# Patient Record
Sex: FEMALE | URBAN - METROPOLITAN AREA
[De-identification: names, ages, dates, MRNs, and addresses within clinical notes are randomized per-mention and may not be internally consistent; named-entity substitution may affect disease eponyms.]

---

## 2018-06-04 ENCOUNTER — MEDICAL CORRESPONDENCE (OUTPATIENT)
Dept: HEALTH INFORMATION MANAGEMENT | Facility: CLINIC | Age: 38
End: 2018-06-04

## 2018-06-05 ENCOUNTER — TELEPHONE (OUTPATIENT)
Dept: RHEUMATOLOGY | Facility: CLINIC | Age: 38
End: 2018-06-05

## 2018-06-05 NOTE — TELEPHONE ENCOUNTER
[facility] Call Center    Phone Message    May a detailed message be left on voicemail: yes    Reason for Call: Other: New Patient Process, DX: Muscle Spasms and Tension, referred by Pcp Dr. Shameka Harper, records and referral from [facility], will fax.     Action Taken: Message routed to:  [facility]

## 2018-06-18 NOTE — TELEPHONE ENCOUNTER
[facility]    Phone Message    May a detailed message be left on voicemail: no    Reason for Call: Other: Pt is returning Swati's call     Action Taken: Message routed to:  [facility] Rheum

## 2018-06-18 NOTE — TELEPHONE ENCOUNTER
Call was placed to patient regarding the referral we received for muscle spasms and tension from Shameka Harper, however, there was no answer. Message was left asking patient to call the clinic back to complete an intake form and discuss if there are outside records needed. Awaiting a call back from the patient.  Swati Huff CMA  6/18/2018 10:54 AM

## 2018-06-20 NOTE — TELEPHONE ENCOUNTER
Second and final attempt to contact patient regarding the referral we received. Message left asking patient to return my call to complete an intake form.  Swati Huff CMA  6/20/2018 3:12 PM

## 2018-06-20 NOTE — TELEPHONE ENCOUNTER
[facility]    Phone Message    May a detailed message be left on voicemail: yes    Reason for Call: Other: Pt is calling Swati back. She will try to catch Swati's call - she has meetings off and on.     Action Taken: Message routed to:  [facility]

## 2018-06-22 NOTE — TELEPHONE ENCOUNTER
General Rheumatology Intake Form      What is the reason for referral? Muscle spasms, muscle tension      What is the name of the provider that referred you to us? Shameka Harper at [facility]      Have you seen a Rheumatologist in the past?  no        Have you been diagnosed with Fibromyalgia? Not formally diagnosed, however the possibility has been suggested        Who manages your care for this issue now? Dr. Harper       What is your most urgent concern at this time? Patient stated that she feels like her muscles are overreacting causing her fatigue        Have you seen any specialist related to the reason you are coming here? no       Where are we expecting records (labs, imaging or pathology) from? Records are available via [facility] Everywhere for [facility]    Chart has been sent the Rheumatology Team to review to see if appropriate to schedule.       Swati Huff CMA  6/22/2018 10:33 AM

## 2018-06-26 NOTE — TELEPHONE ENCOUNTER
Per the Rheumatology Team, it is recommended that the patient see a community Rheumatologist. Spoke to patient and relayed this information to her. Patient verbalized understanding.  Swati Huff CMA  6/26/2018 2:31 PM

## 2019-04-19 ENCOUNTER — HEALTH MAINTENANCE LETTER (OUTPATIENT)
Age: 39
End: 2019-04-19

## 2019-05-20 ENCOUNTER — OFFICE VISIT (OUTPATIENT)
Dept: OPHTHALMOLOGY | Facility: CLINIC | Age: 39
End: 2019-05-20
Attending: OPHTHALMOLOGY
Payer: COMMERCIAL

## 2019-05-20 DIAGNOSIS — H50.10 EXOTROPIA OF BOTH EYES: ICD-10-CM

## 2019-05-20 DIAGNOSIS — H57.89 THYROID EYE DISEASE: Primary | ICD-10-CM

## 2019-05-20 DIAGNOSIS — E07.9 THYROID EYE DISEASE: Primary | ICD-10-CM

## 2019-05-20 PROBLEM — M79.10 MYALGIA: Status: ACTIVE | Noted: 2017-09-28

## 2019-05-20 PROBLEM — E05.90 HYPERTHYROIDISM: Status: ACTIVE | Noted: 2018-07-26

## 2019-05-20 PROBLEM — E55.9 VITAMIN D DEFICIENCY: Status: ACTIVE | Noted: 2017-12-06

## 2019-05-20 PROCEDURE — G0463 HOSPITAL OUTPT CLINIC VISIT: HCPCS | Mod: ZF

## 2019-05-20 PROCEDURE — 92060 SENSORIMOTOR EXAMINATION: CPT | Mod: ZF | Performed by: OPHTHALMOLOGY

## 2019-05-20 PROCEDURE — 92285 EXTERNAL OCULAR PHOTOGRAPHY: CPT | Mod: ZF | Performed by: OPHTHALMOLOGY

## 2019-05-20 RX ORDER — CYCLOBENZAPRINE HCL 5 MG
5-10 TABLET ORAL
COMMUNITY
Start: 2017-09-28

## 2019-05-20 RX ORDER — SERTRALINE HYDROCHLORIDE 100 MG/1
TABLET, FILM COATED ORAL
COMMUNITY
Start: 2019-05-20

## 2019-05-20 RX ORDER — METHIMAZOLE 10 MG/1
TABLET ORAL
COMMUNITY
Start: 2019-04-30

## 2019-05-20 RX ORDER — CHLORAL HYDRATE 500 MG
2000 CAPSULE ORAL
COMMUNITY

## 2019-05-20 ASSESSMENT — TONOMETRY
IOP_METHOD: UPGAZE
OD_IOP_MMHG: 21
IOP_METHOD: ICARE
OS_IOP_MMHG: 22
OS_IOP_MMHG: 20
OD_IOP_MMHG: 19

## 2019-05-20 ASSESSMENT — REFRACTION_WEARINGRX
OD_AXIS: 045
OS_SPHERE: -7.00
OS_CYLINDER: SPHERE
OD_CYLINDER: +0.50
OD_SPHERE: -6.75

## 2019-05-20 ASSESSMENT — CONF VISUAL FIELD
METHOD: COUNTING FINGERS
OS_NORMAL: 1
OD_NORMAL: 1

## 2019-05-20 ASSESSMENT — LAGOPHTHALMOS
OD_LAGOPHTHALMOS: 0
OS_LAGOPHTHALMOS: 0

## 2019-05-20 ASSESSMENT — VISUAL ACUITY
OD_CC: 20/25
OS_CC: 20/20
OD_CC+: +1-1
CORRECTION_TYPE: CONTACTS
METHOD: SNELLEN - LINEAR

## 2019-05-20 ASSESSMENT — SLIT LAMP EXAM - LIDS
COMMENTS: NORMAL
COMMENTS: NORMAL

## 2019-05-20 ASSESSMENT — CUP TO DISC RATIO
OS_RATIO: 0.3
OD_RATIO: 0.3

## 2019-05-20 ASSESSMENT — LEVATOR FUNCTION
OD_LEVATOR: 15
OS_LEVATOR: 15

## 2019-05-20 ASSESSMENT — MARGIN REFLEX DISTANCE
OD_MRD1: 6
OS_MRD1: 6

## 2019-05-20 ASSESSMENT — EXTERNAL EXAM - RIGHT EYE: OD_EXAM: NORMAL

## 2019-05-20 ASSESSMENT — EXTERNAL EXAM - LEFT EYE: OS_EXAM: NORMAL

## 2019-05-20 NOTE — LETTER
"2019         RE:  :  MRN: Gallo Mccoy  1980  [id number]     Dear Dr. Lund,    Thank you for asking me to see your very pleasant patient, Gallo Mccoy, in neuro-ophthalmic consultation.  I would like to thank you for sending your records and I have summarized them in the history of present illness.  My assessment and plan are below.  For further details, please see my attached clinic note.         Assessment & Plan     HPI: 38 F w/ hx of hyperthyroid, dx 3/2018. Notes \"pulling\" sensation in extreme left/right gaze and 2-3 months of light sensitivity. Stable dryness upon waking both eyes. Hasn't noticed bulging. No vision changes or diplopia. Recently seen in Endo - continuing w/ methimazole 5 mg daily.    Referring physician: Dr Sung    Thyroid history:  Diagnosed when? 3/2018  DYE: NA  Thyroidectomy: NA  TSI (date): 3.9 (3/2018)         Eye symptoms (since when):   Proptosis (better/worse/same since last visit): NA  Diplopia(better/worse/same since last visit): NA  Eyelid retraction(better/worse/same since last visit): NA  Tearing(better/worse/same since last visit): NA  Redness (better/worse/same since last visit): baseline predates thyroid dx  Pain ((better/worse/same since last visit): intermittent each eye, stable  Pain to move the eyes (better/worse/same since last visit): worse  Blurred vision: NA    Ocular history:   Orbital decompression (date, details): NA  Strabismus surgery (date, details): NA  Eyelid surgery (date, details): NA    Exam:   Kina (base):93   Strabismus (better/worse/same): NA  Eyelid retraction (better/worse/same): NA    Gallo Mccoy is a 38 year old female with the following diagnoses:     1. Thyroid eye disease    2. Exotropia of both eyes       Thyroid eye disease (JAMILA).  The natural history of thyroid eye disease was discussed. We told the patient that typically JAMILA will worsen for a period of time, then improve to some degree, and then stabilize without " normalizing.  This process can take somewhere between 1 and 3 years on average.  In the meantime, we recommended seeing the patient in the [facility] every 6 months (sooner if the patient experiences worsening vision in either eye).  Once the patient becomes stable for at least 6 months' time, we discussed that the patient may need restorative surgery or prisms.  Finally, we discussed that correcting the thyroid hormone levels does not ensure that the eyes will normalize but that it could help to some degree.       Again, thank you for allowing me to participate in the care of your patient.      Sincerely,    Tim Hobbs MD  Professor  Ophthalmology Residency   Director of Neuro-Ophthalmology  [facility] [facility] Endowed Chair  Departments of Ophthalmology, Neurology, and Neurosurgery  [facility] [address] [place]  [address]  T - [phone]  F - [phone]  [email or web address]      CC: Zaheer Lund PA-C  [facility] [place]  [address]  [facility] [address]  VIA Facsimile: [phone]     Shameka Harper OD  [place] [address] [facility] [address]  VIA Facsimile: [phone]       DX = Thyroid eye disease

## 2019-05-20 NOTE — PROGRESS NOTES
"     Assessment & Plan     HPI: 38 F w/ hx of hyperthyroid, dx 3/2018. Notes \"pulling\" sensation in extreme left/right gaze and 2-3 months of light sensitivity. Stable dryness upon waking both eyes. Hasn't noticed bulging. No vision changes or diplopia. Recently seen in [facility] - continuing w/ methimazole 5 mg daily.    Referring physician: Dr Sung    Thyroid history:  Diagnosed when? 3/2018  DYE: NA  Thyroidectomy: LUIS  TSI (date): 3.9 (3/2018)         Eye symptoms (since when):   Proptosis (better/worse/same since last visit): NA  Diplopia(better/worse/same since last visit): NA  Eyelid retraction(better/worse/same since last visit): NA  Tearing(better/worse/same since last visit): NA  Redness (better/worse/same since last visit): baseline predates thyroid dx  Pain ((better/worse/same since last visit): intermittent each eye, stable  Pain to move the eyes (better/worse/same since last visit): worse  Blurred vision: NA    Ocular history:   Orbital decompression (date, details): NA  Strabismus surgery (date, details): NA  Eyelid surgery (date, details): NA    Exam:   Kina (base):93 19/19  Strabismus (better/worse/same): NA  Eyelid retraction (better/worse/same): NA    Gallo Mccoy is a 38 year old female with the following diagnoses:     1. Thyroid eye disease    2. Exotropia of both eyes       Thyroid eye disease (JAMILA).  The natural history of thyroid eye disease was discussed. We told the patient that typically JAMILA will worsen for a period of time, then improve to some degree, and then stabilize without normalizing.  This process can take somewhere between 1 and 3 years on average.  In the meantime, we recommended seeing the patient in the [facility] every 6 months (sooner if the patient experiences worsening vision in either eye).  Once the patient becomes stable for at least 6 months' time, we discussed that the patient may need restorative surgery or prisms.  Finally, we discussed that " correcting the thyroid hormone levels does not ensure that the eyes will normalize but that it could help to some degree.              Attending Physician Attestation:  Complete documentation of historical and exam elements from today's encounter can be found in the full encounter summary report (not reduplicated in this progress note).  I personally obtained the chief complaint(s) and history of present illness.  I confirmed and edited as necessary the review of systems, past medical/surgical history, family history, social history, and examination findings as documented by others; and I examined the patient myself.  I personally reviewed the relevant tests, images, and reports as documented above.  I formulated and edited as necessary the assessment and plan and discussed the findings and management plan with the patient and family. - Tim Millan MD    Department of Ophthalmology - PGY2

## 2019-05-20 NOTE — PATIENT INSTRUCTIONS
Thyroid Eye Disease  An Overview  Shai Matthews M.D.  Tim Hobbs M.D.       INTRODUCTION  Thyroid eye disease, thyroid orbitopathy and dysthyroid ophthalmopathy are all names which describe a disorder resulting from inflammation of the muscles that move the eyes and the fat within the bony orbit that surrounds the eyes.  Thyroid eye disease may cause the eyes to bulge forward and the lids to become swollen, red and retracted. This disease occurs four times more in females and although it may occur at any age, it is most common in middle aged individuals. It is important to note that 10% of all women will have a thyroid abnormality by the age of 55, and 20% of these individuals will have clinically significant eye changes.     ASSOCIATION WITH THYROID ABNORMALITIES  Thyroid eye disease is typically associated with disorders of the thyroid gland which sits in front of the lower throat. This gland produces thyroxine which is a hormone which affects appetite, metabolism, heart rate and body temperature.  Symptoms associated with high levels of thyroxine, or hyperthyroidism, include weight loss, nervousness, tremors, hair loss, menstrual irregularities and rapid pulse. Low levels, or hypothyroidism, may cause weight gain, depression, fatigue, and cold intolerance. Up to 10% of patients with thyroid abnormalities may develop thyroid eye disease. Sixty percent develop the eye disease within a year of the thyroid problems. Some patients with thyroid eye disease never have any thyroid problem at all. Although thyroid eye disease is associated with thyroid conditions, the two diseases progress and respond to treatment independently.     CAUSE OF THE DISEASE  The cause of thyroid eye disease is still unknown. It is likely due to an immune system attack on the muscle, fat and tear gland surrounding the eye. Other immune disorders include rheumatoid arthritis, lupus, diabetes and myasthenia gravis. Patients with one  immune disorder are often at risk for having others.     SYMPTOMS AND SIGNS     1. EYE PROTRUSION (PROPTOSIS OR EXOPHTHALMOS)      Swelling of the tissues behind the eyes may cause the eyes to protrude.  There are 6 muscles that move the eye. Four of these, the inferior rectus, medial rectus, lateral rectus and superior rectus are most frequently involved. These muscles originate behind the eye at the peak of the eye socket and attach to the eye just behind the cornea. The muscles cannot be seen on the surface of the eye but may become visible as the blood vessels over their anterior portion become prominent with the inflammatory reaction. The immune system singles out the fibroblasts, support cells within the muscles causing the muscles to enlarge.  With muscle enlargement, the eyeball is pushed forward. Frequently one eye is more bulged than the other, but generally both eyes become involved. As the muscles enlarge three things may happen: the eyeball protrudes, the muscles become stiff leading to abnormal movement and the muscles may press on the optic nerve (which transmits visual information from the eye to the brain).     2. EYELID RETRACTION   Inflammation and scarring of the muscles that open the eyelids may leave them abnormally open causing the characteristic  stare . This may also be exaggerated by the protrusion of the eyes in some cases.  In some patients, the eyelids may become so retracted that the eyes don t close completely, even during sleep.     3. DRY EYES AND EXCESSIVE TEARING   Bulging of the eye, eyelid retraction and inflammation of the tear gland may lead to exposure of the eye with evaporation of the protective tear film layer. This leads to dry eye syndrome with foreign body sensation ( sand in the eyes ) aching, burning and occasionally excessive reflex tearing due to the dryness. Severe cases may lead to breakdown in the surface of the cornea (the clear tissue in front of the iris) with  development of ulceration.     4. EYE AND EYELID REDNESS AND SWELLING   Poor venous drainage secondary to the swollen tissues behind the eyes may cause redness and swelling of the eyelids and conjunctiva (the clear membrane that lines the eyeball). In severe cases the swollen conjunctiva may look like a blister or  jelly  hanging over the eyelid. We strongly advise against the use of over-the-counter eyedrops that  get the red out.  These medications can actually make the problem worse and lead to severe problems.     5. GLAUCOMA   Some patients will develop elevated intraocular pressure (pressure inside the eye) which can lead to glaucoma. This is usually not painful, but if undetected and untreated may lead to vision loss.         6. DOUBLE VISION (DIPLOPIA)      Inflammation and scarring of the muscles that move the eyes may lead to poor movement. In mild cases, one might feel a pulling a sensation in the eyes. With more advanced disease, double vision may develop when looking in certain directions - usually up and out.  The inferior rectus muscle underneath the eye is the most frequently affected. When this muscle becomes stiff, the eye cannot look up normally, leading to double vision with one image above the other. In very severe cases the movement of the eyes can be very restricted with obvious misalignment of the eyes and constant double vision.     7. OPTIC NERVE COMPRESSION      Severe swelling of the muscles near the back of the orbit may press on the optic nerve (the cable carrying vision from the eye to the brain).  Early symptoms include dimming of vision and fading of colors.  Permanent visual loss may occur if this process is not recognized and promptly treated.  This severe complication occurs in only 5% of patients with thyroid eye disease and can be reversible if the pressure on the nerve is relieved.     COURSE OF THYROID EYE DISEASE   Thyroid eye disease typically has an inflammatory phase which  lasts 1 to 2 years (range 6 months to 5 years). After the inflammation has subsided, patients are left with a number of structural changes around the eyes that may require treatment.  Recurrences of the active phase occur in about 1% of patients. There is no perfect test to determine when a patient has passed from the  active  phase to  inactive  and instead we rely on your and our high of observation. We assume that if the eyes have not changed for 6 months, then you have entered the  inactive  phase. We also obtain a  blood test  for thyroid stimulating immunoglobulin (TSIG) which gives some evidence of the immune system s reaction against the thyroid and orbital tissues.     It is important to recognize that every patient s course of thyroid eye disease is different and unique.  Some may have minimal signs while others have sudden onset of severe complications such as severe eyelid swelling, bulging of the eyes and vision loss.     We are still unable to determine which complications a particular patient will develop. Patients are therefore followed on a regular basis during the active phase of the disease.  Any new signs (redness, swelling, bulging) or symptoms (double vision, blurring, pain) should be reported immediately in case specific treatment in needed.  A telephone call to your doctor is never  a bother  but is a smart and responsible action.        SMOKING AND STRESS      A clear association between smoking and severity of thyroid eye disease has been demonstrated in many scientific studies, especially for women.  All patients who have thyroid eye disease and smoke should make a strong effort to stop smoking. Stress is also associated with exacerbations of thyroid eye disease.  We are happy to help refer you to the appropriate professionals, if you feel that would be beneficial.     DIAGNOSIS AND INVESTIGATIONS     Thyroid eye disease is diagnosed by the clinical features described above.  It is  confirmed using CT or MRI scans to show the enlarged muscles around the eyes.  Laboratory tests may be used to determine thyroid function (TSH) and inflammatory indicators such as TSIG (described above) may be used to follow the course of your disease.     Other tests may be ordered to document visual function and eye movements.  Photographs will be used to document the appearance of the eyes and  progression over time.     TREATMENT OF ACTIVE PHASE     1. THYROID GLAND TREATMENT   Your endocrinologist may prescribe various medications to suppress or increase your thyroid hormone level.  A radioactive iodine drink or thyroid removal surgery may be offered to destroy part of the gland.  Although these treatments are important for a patient s well-being, they do not appear to influence the course of thyroid eye disease in most patients. Some studies have suggested that radioactive iodine treatment may cause worsening of the eye disease. In our experience, this is rare and may be treated with oral steroid medications if necessary.      2. EYE MEDICATIONS   Mild exposure symptoms, dry eyes and excessive tearing can often be treated with lubricating eye drops (artificial tears) and ointments. Sometimes other medications such as topical steroids or Restasis may be prescribed to treat inflammation of the surface of the eyes. Punctal plugs may be placed into the small opening of your tear drain to keep the tears and medications around longer.     3. LIFESTYLE CHANGES   Swelling changes may be relieved by cutting the amount of salt in your diet and by elevating the head of your bed by placing bricks under the supports at the head of your bed.     4. ANTI-INFLAMMATORY MEDICATIONS   Moderate to severe inflammation of the eyelids and conjunctiva may be treated with corticosteroids (prednisone) or other immune-modulating medications.  Some people respond well to these medications but others do not.  If a person is going to  respond, they usually do so rapidly within the first few days.  In cases of optic nerve compression, these medications may be used in combination with radiation and/or surgery.  Because these medications have serious side effects, they are not used for darin periods of time (more than 6-8 weeks) or in cases of mild thyroid eye disease.     5. STEROID INJECTIONS   Due to the serious side effects of oral steroid medicines, we have been injecting small amounts of  these medicines around the eyes in patients with persistent inflammation. The injections are performed in the office and typically only take a few minutes to perform and the benefits last 6-8 weeks. Some patients require multiple injections until the disease burns out.     6. RADIATION THERAPY   Radiation therapy has been shown to reduce inflammation and is offered in cases of thyroid eye disease with moderate to severe soft tissue signs. The treatment is performed by a radiation specialist over a period of 1 to 2 weeks with each treatment lasting 30 to 60 minutes. The effects of the radiation may not be seen for 6-8 weeks after the last treatment. The exact mechanism of action of radiation of the tissues behind the eyes is still unknown, but we have seen excellent results in carefully selected cases. If you are diabetic or pregnant, you should not undergo radiation treatment.     7. ORBITAL DECOMPRESSION   Despite the effectiveness of oral medications, radiation treatment or both, there are some patients who continue to experience loss of vision due to swelling of the muscles which compress the optic nerve. In an effort to salvage the vision in these individuals, orbital decompression surgery is typically performed. In this procedure, bone is removed from the orbit to allow the orbital tissues to relax into the sinuses behind the eyes, taking the pressure off of the optic nerve.  This procedure may be performed by an oculoplastic surgeon, occasionally with   the help of an ear, nose and throat specialist who specializes in sinus surgery.     TREATMENT IN THE INACTIVE PHASE     After your disease has passed into the inactive phase, there are a number of procedures that are available to help recapture some of your normal eye function and appearance.     1. ORBITAL DECOMPRESSION SURGERY      Orbital decompression surgery is performed to allow the eyes to recess back into the orbital space. The procedure typically involves removing one or more of the bony walls surrounding the eyes as well as some of the excess fat that has developed behind the eyes. The surgery is performed for relief of bulging and will also alleviate the pressure sensation that many patients experience. Surgery takes approximately one hour for each eye and is often staged so that each eye has time to recover. This surgery is typically performed by an ophthalmologist trained in orbital surgery.     2. MUSCLE ALIGNMENT (STRABISMUS) SURGERY      Following orbital decompression surgery, 10-20% of patients will develop new or worsening of their double vision. Strabismus surgery is performed to re-align the eyes to help with this. Often patients will regain single vision in straight ahead gaze but may continue to have double in side or up and down gazes. This surgery is performed under general anesthesia and takes 1-2 hours.  Often the muscle is put on a temporary suture that is permanently sutured in the correct position after the patient wakes up from the general anesthesia.  It is performed by an ophthalmologist trained in complex muscle surgery in kids and adults.     3. EYELID REPOSITIONING SURGERY      Many patients with thyroid eye disease have eyelid malpositions, primarily retraction of the upper and sometimes the lower eyelids. Strabismus surgery will often accentuate these problems. Upper eyelid recession is performed to lengthen and lower the upper eyelid. Often this can be performed through the  inside of the eyelid preventing an external incision and scar. If the eyelid retraction is severe, the procedure is performed through an incision in the natural eyelid crease.  Lower eyelids may be pulled downward in thyroid eye disease. Surgery is typically performed through a small incision made in the outer corner of the eyelids and an internal incision made on the inside of the eyelid. When the lower lid retraction is severe, a spacer graft may be placed on the inside of the eyelid to  splint  the eyelid upward. Surgery is usually performed on an outpatient basis under sedation and typically takes 20-30 minutes per lid and is performed by an ophthalmologist trained in plastic and reconstructive surgery around the eye.     4. COSMETIC EYELID SURGERY      Many patients develop prolapse of fat and excess skin following severe swelling that sometimes accompanies thyroid eye disease. Corrective surgery may be performed to remove some of this excess tissue. This surgery is usually performed on an outpatient basis under sedation and takes approximately one hour. It is performed by an ophthalmologist trained in plastic and reconstructive surgery around the eye     FREQUENTLY ASKED QUESTIONS   The doctors tell me they fixed my thyroid and that is now normal. Why are my eyes acting up?     In Graves  disease the thyroid gland is stimulated by the immune system to produce too much hormone. This excess hormone results in nervousness, palpitations, weight loss and tremors. Treatment is aimed at limiting the gland s ability to produce thyroid hormone.  This may be done with medications, radioactive iodine or surgery.  This does not, however, affect the primary auto-immune process and the immune system may continue to target other tissues including the tissues behind the eyes.  Orbital symptoms may even worsen following thyroid gland treatment.  As discussed, the eye and orbit must be treated separately.     The steroids make  my eyes much more comfortable. Can t I just continue using them?     Steroid therapy is very effective in halting the inflammatioin caused by thyroid eye disease and partially shrinking the muscle tissue. Steroid side effects are very common with continued treatment and can lead to high blood pressure, diabetes, weight gain and bone problems.  If there are continued problems with double vision, exposure of the eye or loss of vision then radiation treatment, steroid injections or surgery should be considered.     Why can t you fix my eyelids now?   Eye muscle surgery on the vertically acting muscles of the eye may cause a change in eyelid position.  Thus, we do not perform eyelid surgery until we have completed all other orbital and eye muscle surgery.

## 2019-05-20 NOTE — NURSING NOTE
Chief Complaints and History of Present Illnesses   Patient presents with     Consult For     Chief Complaint(s) and History of Present Illness(es)     Consult For     Laterality: both eyes    Onset: gradual    Onset: years ago    Quality: Feels that the va is the same    Frequency: constantly    Associated symptoms: photophobia (has increased) and dryness (in the am).  Negative for double vision (+pressure  )    Pain scale: 0/10              Comments     Discomfort with EOM  Bianca Che COT 1:27 PM May 20, 2019

## 2019-05-20 NOTE — Clinical Note
"5/20/2019      RE: Gallo Mccoy  [address]  [facility] [id number]            Assessment & Plan     HPI: 38 F w/ hx of hyperthyroid, dx 3/2018. Notes \"pulling\" sensation in extreme left/right gaze and 2-3 months of light sensitivity. Stable dryness upon waking both eyes. Hasn't noticed bulging. No vision changes or diplopia. Recently seen in Endo - continuing w/ methimazole 5 mg daily.    Referring physician: Dr Sung    Thyroid history:  Diagnosed when? 3/2018  DYE: NA  Thyroidectomy: NA  TSI (date): 3.9 (3/2018)         Eye symptoms (since when):   Proptosis (better/worse/same since last visit): NA  Diplopia(better/worse/same since last visit): NA  Eyelid retraction(better/worse/same since last visit): NA  Tearing(better/worse/same since last visit): NA  Redness (better/worse/same since last visit): baseline predates thyroid dx  Pain ((better/worse/same since last visit): intermittent each eye, stable  Pain to move the eyes (better/worse/same since last visit): worse  Blurred vision: NA    Ocular history:   Orbital decompression (date, details): NA  Strabismus surgery (date, details): NA  Eyelid surgery (date, details): NA    Exam:   Kina (base):99   21/20  Strabismus (better/worse/same): NA  Eyelid retraction (better/worse/same): NA    Gallo Mccoy is a 38 year old female with the following diagnoses:     1. Thyroid eye disease    2. Exotropia of both eyes       Exotropia 15 PD at near, 5 PD at distance, not symptomatic. Likely longstanding.  - Selenium supplementation   - Don't start smoking  - 6 month f/u, PRN sooner            Ender Millan MD    Department of Ophthalmology - PGY2           Assessment & Plan     HPI: 38 F w/ hx of hyperthyroid, dx 3/2018. Notes \"pulling\" sensation in extreme left/right gaze and 2-3 months of light sensitivity. Stable dryness upon waking both eyes. Hasn't noticed bulging. No vision changes or diplopia. Recently seen in Endo - continuing w/ methimazole 5 " mg daily.    Referring physician: Dr Sung    Thyroid history:  Diagnosed when? 3/2018  DYE: NA  Thyroidectomy: NA  TSI (date): 3.9 (3/2018)         Eye symptoms (since when):   Proptosis (better/worse/same since last visit): NA  Diplopia(better/worse/same since last visit): NA  Eyelid retraction(better/worse/same since last visit): NA  Tearing(better/worse/same since last visit): NA  Redness (better/worse/same since last visit): baseline predates thyroid dx  Pain ((better/worse/same since last visit): intermittent each eye, stable  Pain to move the eyes (better/worse/same since last visit): worse  Blurred vision: NA    Ocular history:   Orbital decompression (date, details): NA  Strabismus surgery (date, details): NA  Eyelid surgery (date, details): NA    Exam:   Kina (base):93 19/19  Strabismus (better/worse/same): NA  Eyelid retraction (better/worse/same): NA    Gallo Mccoy is a 38 year old female with the following diagnoses:     1. Thyroid eye disease    2. Exotropia of both eyes       Thyroid eye disease (JAMILA).  The natural history of thyroid eye disease was discussed. We told the patient that typically JAMILA will worsen for a period of time, then improve to some degree, and then stabilize without normalizing.  This process can take somewhere between 1 and 3 years on average.  In the meantime, we recommended seeing the patient in the [facility] 6 months (sooner if the patient experiences worsening vision in either eye).  Once the patient becomes stable for at least 6 months' time, we discussed that the patient may need restorative surgery or prisms.  Finally, we discussed that correcting the thyroid hormone levels does not ensure that the eyes will normalize but that it could help to some degree.              Attending Physician Attestation:  Complete documentation of historical and exam elements from today's encounter can be found in the full encounter summary report (not  reduplicated in this progress note).  I personally obtained the chief complaint(s) and history of present illness.  I confirmed and edited as necessary the review of systems, past medical/surgical history, family history, social history, and examination findings as documented by others; and I examined the patient myself.  I personally reviewed the relevant tests, images, and reports as documented above.  I formulated and edited as necessary the assessment and plan and discussed the findings and management plan with the patient and family. - Tim Millan MD    Department of Ophthalmology - PGY2      Tim Hobbs MD

## 2020-03-10 ENCOUNTER — HEALTH MAINTENANCE LETTER (OUTPATIENT)
Age: 40
End: 2020-03-10

## 2020-12-27 ENCOUNTER — HEALTH MAINTENANCE LETTER (OUTPATIENT)
Age: 40
End: 2020-12-27

## 2021-04-24 ENCOUNTER — HEALTH MAINTENANCE LETTER (OUTPATIENT)
Age: 41
End: 2021-04-24

## 2021-10-09 ENCOUNTER — HEALTH MAINTENANCE LETTER (OUTPATIENT)
Age: 41
End: 2021-10-09

## 2022-05-16 ENCOUNTER — HEALTH MAINTENANCE LETTER (OUTPATIENT)
Age: 42
End: 2022-05-16

## 2022-09-11 ENCOUNTER — HEALTH MAINTENANCE LETTER (OUTPATIENT)
Age: 42
End: 2022-09-11

## 2023-06-03 ENCOUNTER — HEALTH MAINTENANCE LETTER (OUTPATIENT)
Age: 43
End: 2023-06-03

## 2024-02-24 ENCOUNTER — HEALTH MAINTENANCE LETTER (OUTPATIENT)
Age: 44
End: 2024-02-24